# Patient Record
Sex: FEMALE | Race: WHITE | Employment: FULL TIME | URBAN - METROPOLITAN AREA
[De-identification: names, ages, dates, MRNs, and addresses within clinical notes are randomized per-mention and may not be internally consistent; named-entity substitution may affect disease eponyms.]

---

## 2017-03-17 ENCOUNTER — OFFICE VISIT (OUTPATIENT)
Dept: URGENT CARE | Facility: URGENT CARE | Age: 42
End: 2017-03-17
Payer: COMMERCIAL

## 2017-03-17 VITALS
HEART RATE: 84 BPM | TEMPERATURE: 98 F | BODY MASS INDEX: 42.72 KG/M2 | WEIGHT: 280.9 LBS | OXYGEN SATURATION: 98 % | RESPIRATION RATE: 20 BRPM | DIASTOLIC BLOOD PRESSURE: 80 MMHG | SYSTOLIC BLOOD PRESSURE: 110 MMHG

## 2017-03-17 DIAGNOSIS — J20.9 ACUTE BRONCHITIS WITH SYMPTOMS > 10 DAYS: Primary | ICD-10-CM

## 2017-03-17 PROCEDURE — 99213 OFFICE O/P EST LOW 20 MIN: CPT | Performed by: FAMILY MEDICINE

## 2017-03-17 RX ORDER — DOXYCYCLINE 100 MG/1
100 CAPSULE ORAL 2 TIMES DAILY
Qty: 20 CAPSULE | Refills: 0 | Status: SHIPPED | OUTPATIENT
Start: 2017-03-17 | End: 2017-03-27

## 2017-03-17 RX ORDER — ALBUTEROL SULFATE 90 UG/1
2 AEROSOL, METERED RESPIRATORY (INHALATION) 4 TIMES DAILY PRN
Qty: 1 INHALER | Refills: 0 | Status: SHIPPED | OUTPATIENT
Start: 2017-03-17 | End: 2017-03-17

## 2017-03-17 RX ORDER — AZITHROMYCIN 250 MG/1
TABLET, FILM COATED ORAL
Qty: 6 TABLET | Refills: 0 | Status: SHIPPED | OUTPATIENT
Start: 2017-03-17 | End: 2017-03-17

## 2017-03-17 NOTE — PROGRESS NOTES
"SUBJECTIVE: Rosa Maria Hester is a 41 year old female presenting with a chief complaint of nasal congestion and cough .  Onset of symptoms was 3 week(s) ago.  Course of illness is same.    Severity moderate  Current and Associated symptoms: \"cold symptoms\"  Treatment measures tried include OTC meds.  Predisposing factors include None.    Past Medical History   Diagnosis Date     NO ACTIVE PROBLEMS      Allergies   Allergen Reactions     No Known Drug Allergies      Social History   Substance Use Topics     Smoking status: Never Smoker     Smokeless tobacco: Not on file     Alcohol use Yes      Comment: 2 drinks a month       ROS:  SKIN: no rash  GI: no vomiting    OBJECTIVE:  /80 (BP Location: Right arm, Patient Position: Chair, Cuff Size: Adult Large)  Pulse 84  Temp 98  F (36.7  C) (Oral)  Resp 20  Wt 280 lb 14.4 oz (127.4 kg)  SpO2 98%  BMI 42.72 kg/a8GHTRPHB APPEARANCE: healthy, alert and no distress  EYES: EOMI,  PERRL, conjunctiva clear  HENT: ear canals and TM's normal.  Nose and mouth without ulcers, erythema or lesions  NECK: supple, nontender, no lymphadenopathy  RESP: lungs clear to auscultation - no rales, rhonchi or wheezes  SKIN: no suspicious lesions or rashes      ICD-10-CM    1. Acute bronchitis with symptoms > 10 days J20.9 doxycycline (VIBRAMYCIN) 100 MG capsule     DISCONTINUED: azithromycin (ZITHROMAX) 250 MG tablet     DISCONTINUED: albuterol (ALBUTEROL) 108 (90 BASE) MCG/ACT Inhaler       Fluids/Rest, f/u if worse/not any better    "

## 2017-03-17 NOTE — NURSING NOTE
"Chief Complaint   Patient presents with     Cough     cough x 3 - 4days  chest pain for the past two days        Initial /80 (BP Location: Right arm, Patient Position: Chair, Cuff Size: Adult Large)  Pulse 84  Temp 98  F (36.7  C) (Oral)  Resp 20  Wt 280 lb 14.4 oz (127.4 kg)  SpO2 98%  BMI 42.72 kg/m2 Estimated body mass index is 42.72 kg/(m^2) as calculated from the following:    Height as of 4/20/14: 5' 7.99\" (1.727 m).    Weight as of this encounter: 280 lb 14.4 oz (127.4 kg).      "

## 2017-03-17 NOTE — MR AVS SNAPSHOT
"              After Visit Summary   3/17/2017    Rosa Maria Hester    MRN: [id number]           Patient Information     Date Of Birth          1975        Visit Information        Provider Department      3/17/2017 10:45 AM Maxi Becker,  [facility]        Today's Diagnoses     Acute bronchitis with symptoms > 10 days    -  1       Follow-ups after your visit        Who to contact     If you have questions or need follow up information about today's clinic visit or your schedule please contact [facility] directly at [phone].  Normal or non-critical lab and imaging results will be communicated to you by [employer], letter or phone within 4 business days after the clinic has received the results. If you do not hear from us within 7 days, please contact the clinic through [employer] or phone. If you have a critical or abnormal lab result, we will notify you by phone as soon as possible.  Submit refill requests through [employer] or call your pharmacy and they will forward the refill request to us. Please allow 3 business days for your refill to be completed.          Additional Information About Your Visit        MyChart Information     [employer] lets you send messages to your doctor, view your test results, renew your prescriptions, schedule appointments and more. To sign up, go to [email or web address] . Click on \"Log in\" on the left side of the screen, which will take you to the Welcome page. Then click on \"Sign up Now\" on the right side of the page.     You will be asked to enter the access code listed below, as well as some personal information. Please follow the directions to create your username and password.     Your access code is: [email or web address]  Expires: 6/15/2017 11:17 AM     Your access code will  in 90 days. If you need help or a new code, please call your [facility] clinic or [phone].        Care EveryWhere ID     This is your Care " EveryWhere ID. This could be used by other organizations to access your [facility] medical records  [id number]        Your Vitals Were     Pulse Temperature Respirations Pulse Oximetry BMI (Body Mass Index)       84 98  F (36.7  C) (Oral) 20 98% 42.72 kg/m2        Blood Pressure from Last 3 Encounters:   03/17/17 110/80   04/20/14 127/80   04/07/07 142/80    Weight from Last 3 Encounters:   03/17/17 280 lb 14.4 oz (127.4 kg)   04/20/14 242 lb (109.8 kg)   10/24/05 258 lb (117 kg)              Today, you had the following     No orders found for display         Today's Medication Changes          These changes are accurate as of: 3/17/17 11:18 AM.  If you have any questions, ask your nurse or doctor.               Start taking these medicines.        Dose/Directions    doxycycline 100 MG capsule   Commonly known as:  VIBRAMYCIN   Used for:  Acute bronchitis with symptoms > 10 days   Started by:  Maxi Becker,         Dose:  100 mg   Take 1 capsule (100 mg) by mouth 2 times daily for 10 days   Quantity:  20 capsule   Refills:  0            Where to get your medicines      These medications were sent to [facility] [place] [address]     Phone:  [phone]     doxycycline 100 MG capsule                Primary Care Provider Office Phone # Fax #    Tyshawndipti Santos [phone]       [facility] [address]  [place] [address]        Thank you!     Thank you for choosing [facility]  for your care. Our goal is always to provide you with excellent care. Hearing back from our patients is one way we can continue to improve our services. Please take a few minutes to complete the written survey that you may receive in the mail after your visit with us. Thank you!             Your Updated Medication List - Protect others around you: Learn how to safely use, store and throw away your medicines at  www.disposemymeds.org.          This list is accurate as of: 3/17/17 11:18 AM.  Always use your most recent med list.                   Brand Name Dispense Instructions for use    doxycycline 100 MG capsule    VIBRAMYCIN    20 capsule    Take 1 capsule (100 mg) by mouth 2 times daily for 10 days       HYDROcodone-acetaminophen 5-325 MG per tablet    NORCO    15 tablet    Take 1-2 tablets by mouth every 4 hours as needed for moderate to severe pain